# Patient Record
Sex: MALE | Race: WHITE | Employment: FULL TIME | URBAN - METROPOLITAN AREA
[De-identification: names, ages, dates, MRNs, and addresses within clinical notes are randomized per-mention and may not be internally consistent; named-entity substitution may affect disease eponyms.]

---

## 2021-06-19 ENCOUNTER — HOSPITAL ENCOUNTER (EMERGENCY)
Age: 31
Discharge: HOME | End: 2021-06-19
Attending: EMERGENCY MEDICINE
Payer: COMMERCIAL

## 2021-06-19 VITALS
DIASTOLIC BLOOD PRESSURE: 84 MMHG | WEIGHT: 155 LBS | HEIGHT: 69 IN | TEMPERATURE: 98 F | SYSTOLIC BLOOD PRESSURE: 142 MMHG | RESPIRATION RATE: 16 BRPM | OXYGEN SATURATION: 100 % | BODY MASS INDEX: 22.96 KG/M2 | HEART RATE: 62 BPM

## 2021-06-19 DIAGNOSIS — K04.7 DENTAL INFECTION: Primary | ICD-10-CM

## 2021-06-19 PROCEDURE — 99283 EMERGENCY DEPT VISIT LOW MDM: CPT

## 2021-06-19 RX ORDER — PENICILLIN V POTASSIUM 500 MG/1
500 TABLET ORAL 4 TIMES DAILY
Qty: 28 TABLET | Refills: 0 | Status: SHIPPED | OUTPATIENT
Start: 2021-06-19 | End: 2021-06-26

## 2021-06-19 NOTE — ED TRIAGE NOTES
Mode of arrival (squad #, walk in, police, etc) : walk in        Chief complaint(s): Right lower dental pain        Arrival Note (brief scenario, treatment PTA, etc). : Pt states he has been having right lower dental pain for 2 days. With no relief from OTC medications. C= \"Have you ever felt that you should Cut down on your drinking? \"  No  A= \"Have people Annoyed you by criticizing your drinking? \"  No  G= \"Have you ever felt bad or Guilty about your drinking? \"  No  E= \"Have you ever had a drink as an Eye-opener first thing in the morning to steady your nerves or to help a hangover? \"  No      Deferred []      Reason for deferring: N/A    *If yes to two or more: probable alcohol abuse. *

## 2021-06-19 NOTE — ED PROVIDER NOTES
[address]  EMERGENCY DEPARTMENT ENCOUNTER      Pt Name: Brock Zapien  MRN: [id number]  [place] 1990  Date of evaluation: 6/19/21      CHIEF COMPLAINT       Chief Complaint   Patient presents with    Dental Pain     HISTORY OF PRESENT ILLNESS   HPI 35 y.o. male presents with c/o tooth pain. Symptoms started 2 days ago. Pain is described as throbbing in character,severe in severity (rating it 8 / 10). The pain is located primarily in the r. Lower posterior tooth with no radiation. The pain has been constant in course. The patient tried ibuprofen prior to arrival with minimal relief of symptoms. REVIEW OF SYSTEMS     Constitution: No fever  HENT: + Dental Pain  Pulmonary: No shortness of breath  GI: No vomiting  Neurologic: No headache    PAST MEDICAL HISTORY   History reviewed. No pertinent past medical history. SURGICAL HISTORY     History reviewed. No pertinent surgical history. CURRENT MEDICATIONS       Previous Medications    No medications on file       ALLERGIES     has No Known Allergies. FAMILY HISTORY     has no family status information on file. SOCIAL HISTORY      reports that he has never smoked. He has never used smokeless tobacco. He reports current alcohol use. He reports that he does not use drugs. PHYSICAL EXAM     INITIAL VITALS: BP (!) 142/84   Pulse 62   Temp 98 °F (36.7 °C) (Oral)   Resp 16   Ht 5' 9\" (1.753 m)   Wt 155 lb (70.3 kg)   SpO2 100%   BMI 22.89 kg/m²     General: NAD  Head: NCAT  Face: No edema  ENT: Dental caries. There is no large periapical abscess that would require bedside drainage. There is tenderness to palpation over the tooth.   Neck: There is no erythema no induration no adenopathy no stridor  Cardiovascular: RRR  Pulmonary: CTA  Neuro: Patient's alert and oriented x3 fluent speech ambulatory with a normal gait    MEDICAL DECISION MAKING:     MDM    This is a 35 y.o. male with dental pain most likely from an infected tooth.  There is no sign of Will's angina. There is no large periapical abscess that would require a bedside drainage. We will start the patient on antibiotics & analgesics. Referred the patient to follow up with dentist.  D/w pt treatment plan, warning precautions for prompt ED return and importance of close OP FU, he verbalizes understanding and agrees with the treatment plan. EMERGENCY DEPARTMENT COURSE:   Vitals:    Vitals:    06/19/21 1827   BP: (!) 142/84   Pulse: 62   Resp: 16   Temp: 98 °F (36.7 °C)   TempSrc: Oral   SpO2: 100%   Weight: 155 lb (70.3 kg)   Height: 5' 9\" (1.753 m)       The patient was given the following medications while in the emergency department:  Orders Placed This Encounter   Medications    benzocaine (ORAJEL) 10 % mucosal gel     Sig: Take by mouth as needed. Dispense:  7 g     Refill:  0    penicillin v potassium (VEETID) 500 MG tablet     Sig: Take 1 tablet by mouth 4 times daily for 7 days     Dispense:  28 tablet     Refill:  0     -------------------------  CRITICAL CARE:   CONSULTS: None  PROCEDURES: Procedures     FINAL IMPRESSION      1. Dental infection          DISPOSITION/PLAN   DISPOSITION Decision To Discharge 06/19/2021 06:31:53 PM      PATIENT REFERRED TO:  No follow-up provider specified. DISCHARGE MEDICATIONS:  New Prescriptions    BENZOCAINE (ORAJEL) 10 % MUCOSAL GEL    Take by mouth as needed.     PENICILLIN V POTASSIUM (VEETID) 500 MG TABLET    Take 1 tablet by mouth 4 times daily for 7 days         Rajni Palumbo MD  Attending Emergency Physician                      Rajni Palumbo MD  06/19/21 3836

## 2021-06-25 ENCOUNTER — APPOINTMENT (OUTPATIENT)
Dept: CT IMAGING | Age: 31
End: 2021-06-25
Payer: COMMERCIAL

## 2021-06-25 ENCOUNTER — APPOINTMENT (OUTPATIENT)
Dept: GENERAL RADIOLOGY | Age: 31
End: 2021-06-25
Payer: COMMERCIAL

## 2021-06-25 ENCOUNTER — HOSPITAL ENCOUNTER (EMERGENCY)
Age: 31
Discharge: HOME | End: 2021-06-25
Attending: EMERGENCY MEDICINE
Payer: COMMERCIAL

## 2021-06-25 VITALS
TEMPERATURE: 98.8 F | BODY MASS INDEX: 22.73 KG/M2 | RESPIRATION RATE: 12 BRPM | OXYGEN SATURATION: 99 % | DIASTOLIC BLOOD PRESSURE: 87 MMHG | HEIGHT: 68 IN | SYSTOLIC BLOOD PRESSURE: 127 MMHG | WEIGHT: 150 LBS | HEART RATE: 72 BPM

## 2021-06-25 DIAGNOSIS — R55 SYNCOPE AND COLLAPSE: Primary | ICD-10-CM

## 2021-06-25 LAB
ABSOLUTE EOS #: 0 K/UL (ref 0–0.4)
ABSOLUTE IMMATURE GRANULOCYTE: ABNORMAL K/UL (ref 0–0.3)
ABSOLUTE LYMPH #: 0.7 K/UL (ref 1–4.8)
ABSOLUTE MONO #: 1 K/UL (ref 0.1–1.2)
ANION GAP SERPL CALCULATED.3IONS-SCNC: 10 MMOL/L (ref 9–17)
BASOPHILS # BLD: 0 % (ref 0–2)
BASOPHILS ABSOLUTE: 0 K/UL (ref 0–0.2)
BUN BLDV-MCNC: 19 MG/DL (ref 6–20)
BUN/CREAT BLD: NORMAL (ref 9–20)
CALCIUM SERPL-MCNC: 8.9 MG/DL (ref 8.6–10.4)
CHLORIDE BLD-SCNC: 105 MMOL/L (ref 98–107)
CO2: 25 MMOL/L (ref 20–31)
CREAT SERPL-MCNC: 0.71 MG/DL (ref 0.7–1.2)
D-DIMER QUANTITATIVE: 0.4 MG/L FEU
DIFFERENTIAL TYPE: ABNORMAL
EOSINOPHILS RELATIVE PERCENT: 0 % (ref 1–4)
GFR AFRICAN AMERICAN: >60 ML/MIN
GFR NON-AFRICAN AMERICAN: >60 ML/MIN
GFR SERPL CREATININE-BSD FRML MDRD: NORMAL ML/MIN/{1.73_M2}
GFR SERPL CREATININE-BSD FRML MDRD: NORMAL ML/MIN/{1.73_M2}
GLUCOSE BLD-MCNC: 89 MG/DL (ref 70–99)
HCT VFR BLD CALC: 34.3 % (ref 41–53)
HEMOGLOBIN: 11.7 G/DL (ref 13.5–17.5)
IMMATURE GRANULOCYTES: ABNORMAL %
LYMPHOCYTES # BLD: 6 % (ref 24–44)
MCH RBC QN AUTO: 29.7 PG (ref 26–34)
MCHC RBC AUTO-ENTMCNC: 34 G/DL (ref 31–37)
MCV RBC AUTO: 87.4 FL (ref 80–100)
MONOCYTES # BLD: 8 % (ref 2–11)
NRBC AUTOMATED: ABNORMAL PER 100 WBC
PDW BLD-RTO: 13.5 % (ref 12.5–15.4)
PLATELET # BLD: 367 K/UL (ref 140–450)
PLATELET ESTIMATE: ABNORMAL
PMV BLD AUTO: 7.3 FL (ref 6–12)
POTASSIUM SERPL-SCNC: 3.7 MMOL/L (ref 3.7–5.3)
RBC # BLD: 3.93 M/UL (ref 4.5–5.9)
RBC # BLD: ABNORMAL 10*6/UL
SEG NEUTROPHILS: 86 % (ref 36–66)
SEGMENTED NEUTROPHILS ABSOLUTE COUNT: 10.4 K/UL (ref 1.8–7.7)
SODIUM BLD-SCNC: 140 MMOL/L (ref 135–144)
TROPONIN INTERP: NORMAL
TROPONIN T: NORMAL NG/ML
TROPONIN, HIGH SENSITIVITY: <6 NG/L (ref 0–22)
WBC # BLD: 12.1 K/UL (ref 3.5–11)
WBC # BLD: ABNORMAL 10*3/UL

## 2021-06-25 PROCEDURE — 99283 EMERGENCY DEPT VISIT LOW MDM: CPT

## 2021-06-25 PROCEDURE — 85025 COMPLETE CBC W/AUTO DIFF WBC: CPT

## 2021-06-25 PROCEDURE — 71045 X-RAY EXAM CHEST 1 VIEW: CPT

## 2021-06-25 PROCEDURE — 80048 BASIC METABOLIC PNL TOTAL CA: CPT

## 2021-06-25 PROCEDURE — 93005 ELECTROCARDIOGRAM TRACING: CPT | Performed by: PHYSICIAN ASSISTANT

## 2021-06-25 PROCEDURE — 84484 ASSAY OF TROPONIN QUANT: CPT

## 2021-06-25 PROCEDURE — 70450 CT HEAD/BRAIN W/O DYE: CPT

## 2021-06-25 PROCEDURE — 36415 COLL VENOUS BLD VENIPUNCTURE: CPT

## 2021-06-25 PROCEDURE — 85379 FIBRIN DEGRADATION QUANT: CPT

## 2021-06-25 NOTE — ED PROVIDER NOTES
[id number] [facility]  [address] [facility] [address]  Phone: [phone]  Fax: [phone]      eMERGENCY dEPARTMENT eNCOUnter      Pt Name: Tee Shanks  MRN: [id number]  [place] 1990  Date of evaluation: 6/25/21      CHIEF COMPLAINT:  Chief Complaint   Patient presents with    Chest Pain       HISTORY OF PRESENT ILLNESS    Tee Shanks is a 35 y.o. male who presents with evaluation for DIZZINESS:       Context/Timing: Patient here with father for fainting/passing out episode while in the car just prior to arrival.  Patient was actually brought in by EMS. Patient had a significant number of dental extractions earlier this morning, he had been discharged home in good condition. Patient took some Tylenol at that time. Patient was reportedly going over to another residence to  some leftover antibiotic and was reportedly to have slumped over in the ca per father. Father pulled over and called EMS. Patient reportedly was unresponsive for around 2 minutes per father. Patient has no seizure history. He denies any subsequent symptoms since this initial episode. Patient denies any headache, focal weakness or true numbness. Patient denies any associated chest pain or respiratory symptoms patient did report a little bit of some chest pressure initially but that has resolved. Patient has no cardiac, neurological or respiratory history pertinent to today's symptoms. Patient reportedly works third shift and has not slept at all today which is uncommon for him. Patient denies any other changes to medications or daily routine. Quality: As above  Duration: As above  Modifying Factors: None  Severity:   Mild/moderate    Nursing Notes were reviewed. REVIEW OF SYSTEMS       Constitutional: Denies recent fever, chills. HENT: per HPI  Neck: per HPI  Respiratory: Denies recent shortness of breath. Cardiac:  Per HPI  GI:  Per HPI. : Denies dysuria.     Musculoskeletal: Per HPI  Neurologic:  Per HPI  Skin:  Denies any rash. Negative in 10 essential Systems except as mentioned above and in the HPI. PAST MEDICAL HISTORY   PMH:  has no past medical history on file. Surgical History:  has no past surgical history on file. Social History:  reports that he has never smoked. He has never used smokeless tobacco. He reports current alcohol use. He reports that he does not use drugs. Family History: None  Psychiatric History: None    Allergies:has No Known Allergies. PHYSICAL EXAM     INITIAL VITALS: /85   Pulse 72   Temp 98.8 °F (37.1 °C) (Oral)   Resp 12   Ht 5' 8\" (1.727 m)   Wt 68 kg (150 lb)   SpO2 99%   BMI 22.81 kg/m²     Constitutional:  Well developed   Eyes:  Pupils equal and readily reactive to light. EOMI. HENT:  Atraumatic, Nose normal, External ears normal, Oropharynx moist.   Upper dental plate in place, no significant oral bleeding or oral edema. Comfortable speech and handling secretions. Neck:  No midline pain. Full ROM. Respiratory:  Clear to auscultation bilaterally with good air exchange, no W/R/R  Cardiovascular:  RRR with normal S1 and S2  Gastrointestinal/Abdomen:  Soft, NT.  BS present. Musculoskeletal:    Full ROM and NV intact x4. Back:   No midline pain. Full ROM. No CVA tenderness. Integument:   No rash. Neurologic:  Alert, appropriate mentation/interaction, no focal deficits noted       DIAGNOSTIC RESULTS     EKG: All EKG's are interpreted by the Emergency Department Physician who either signs or Co-signs this chart in the absence of a cardiologist.  Not indicated, or per attending note    RADIOLOGY:   Reviewed the radiologist:  XR CHEST PORTABLE   Final Result   Clear lungs. No acute cardiopulmonary abnormality. CT HEAD WO CONTRAST   Final Result   Normal CT scan of the brain without evidence of acute intracranial   abnormality.                  LABS:  Labs Reviewed   CBC WITH AUTO DIFFERENTIAL - Abnormal;

## 2021-06-25 NOTE — ED NOTES
Patient to ED via EMS d/t C. P. prior to a syncopal episode while he was riding in his father's car. Patient had major dental surgery this a.m. with removal of all his upper teeth. Surgery was cut short to abnormally large bleeding per patient. Patient was given oral glucose at dental office prior to discharge. Patient left in \"good shape\" and went home with his father. Father was taking patient back to get ATB when according to father the patient said \"Dad you better pull over, I'm having C. P.\" and then patient passed out briefly in car. Father pulled in to a nearby business parking spot and went inside and had someone call 911. Per EMS, patient denied C.P. upon their arrival but had trouble recalling events. Pateint states he has eaten some yogurt and taken 650mg of Tylenol.      Ed Rangel RN  06/25/21 0579

## 2021-06-26 NOTE — ED PROVIDER NOTES
[address] [facility]  [address] [facility] [address]  Phone: [phone]  Fax: [phone]      Attending Physician [address]       Chief Complaint   Patient presents with    Chest Pain       DIAGNOSTIC RESULTS     LABS:  Labs Reviewed   CBC WITH AUTO DIFFERENTIAL - Abnormal; Notable for the following components:       Result Value    WBC 12.1 (*)     RBC 3.93 (*)     Hemoglobin 11.7 (*)     Hematocrit 34.3 (*)     Seg Neutrophils 86 (*)     Lymphocytes 6 (*)     Eosinophils % 0 (*)     Segs Absolute 10.40 (*)     Absolute Lymph # 0.70 (*)     All other components within normal limits   BASIC METABOLIC PANEL   D-DIMER, QUANTITATIVE   TROPONIN       All other labs were within normal range or not returned as of this dictation. RADIOLOGY:  XR CHEST PORTABLE   Final Result   Clear lungs. No acute cardiopulmonary abnormality. CT HEAD WO CONTRAST   Final Result   Normal CT scan of the brain without evidence of acute intracranial   abnormality. EMERGENCY DEPARTMENT COURSE:   Vitals:    Vitals:    06/25/21 1615 06/25/21 1630 06/25/21 1740 06/25/21 1741   BP: 123/73 122/77 127/87    Pulse:       Resp:    12   Temp:       TempSrc:       SpO2: 99% 99% (!) 88% 99%   Weight:       Height:         -------------------------  BP: 127/87, Temp: 98.8 °F (37.1 °C), Pulse: 72, Resp: 12             PERTINENT ATTENDING PHYSICIAN COMMENTS:    I performed a history and physical examination of the patient and discussed management with the mid level provider. I reviewed the mid level provider's note and agree with the documented findings and plan of care. Any areas of disagreement are noted on the chart. I was personally present for the key portions of any procedures. I have documented in the chart those procedures where I was not present during the key portions. I have reviewed the emergency nurses triage note.  I agree with the chief complaint, past medical

## 2021-06-27 LAB
EKG ATRIAL RATE: 67 BPM
EKG P AXIS: 50 DEGREES
EKG P-R INTERVAL: 138 MS
EKG Q-T INTERVAL: 390 MS
EKG QRS DURATION: 102 MS
EKG QTC CALCULATION (BAZETT): 412 MS
EKG R AXIS: 74 DEGREES
EKG T AXIS: 54 DEGREES
EKG VENTRICULAR RATE: 67 BPM

## 2021-08-06 ENCOUNTER — HOSPITAL ENCOUNTER (OUTPATIENT)
Age: 31
Discharge: HOME | End: 2021-08-06
Payer: COMMERCIAL

## 2021-08-06 PROCEDURE — 85362 FIBRIN DEGRADATION PRODUCTS: CPT

## 2021-08-06 PROCEDURE — 36415 COLL VENOUS BLD VENIPUNCTURE: CPT

## 2021-08-08 LAB — FDP: <5 UG/ML
